# Patient Record
Sex: MALE | Race: ASIAN | NOT HISPANIC OR LATINO
[De-identification: names, ages, dates, MRNs, and addresses within clinical notes are randomized per-mention and may not be internally consistent; named-entity substitution may affect disease eponyms.]

---

## 2021-03-17 ENCOUNTER — APPOINTMENT (OUTPATIENT)
Dept: OTHER | Facility: CLINIC | Age: 46
End: 2021-03-17
Payer: COMMERCIAL

## 2021-03-17 VITALS
HEIGHT: 67 IN | OXYGEN SATURATION: 98 % | BODY MASS INDEX: 27.62 KG/M2 | RESPIRATION RATE: 18 BRPM | WEIGHT: 176 LBS | SYSTOLIC BLOOD PRESSURE: 144 MMHG | HEART RATE: 52 BPM | DIASTOLIC BLOOD PRESSURE: 95 MMHG | TEMPERATURE: 97.5 F

## 2021-03-17 DIAGNOSIS — Z04.9 ENCOUNTER FOR EXAMINATION AND OBSERVATION FOR UNSPECIFIED REASON: ICD-10-CM

## 2021-03-17 DIAGNOSIS — Z23 ENCOUNTER FOR IMMUNIZATION: ICD-10-CM

## 2021-03-17 PROCEDURE — 99396 PREV VISIT EST AGE 40-64: CPT | Mod: 25

## 2021-04-01 LAB
ALBUMIN SERPL ELPH-MCNC: 4.7 G/DL
ALP BLD-CCNC: 76 U/L
ALT SERPL-CCNC: 34 U/L
ANION GAP SERPL CALC-SCNC: 12 MMOL/L
APPEARANCE: CLEAR
AST SERPL-CCNC: 29 U/L
BACTERIA: NEGATIVE
BASOPHILS # BLD AUTO: 0.04 K/UL
BASOPHILS NFR BLD AUTO: 0.7 %
BILIRUB SERPL-MCNC: 0.5 MG/DL
BILIRUBIN URINE: NEGATIVE
BLOOD URINE: NEGATIVE
BUN SERPL-MCNC: 24 MG/DL
CALCIUM SERPL-MCNC: 9.9 MG/DL
CHLORIDE SERPL-SCNC: 99 MMOL/L
CHOLEST SERPL-MCNC: 222 MG/DL
CO2 SERPL-SCNC: 28 MMOL/L
COLOR: NORMAL
CREAT SERPL-MCNC: 1.35 MG/DL
EOSINOPHIL # BLD AUTO: 0.1 K/UL
EOSINOPHIL NFR BLD AUTO: 1.7 %
GLUCOSE QUALITATIVE U: NEGATIVE
GLUCOSE SERPL-MCNC: 95 MG/DL
HCT VFR BLD CALC: 49.6 %
HDLC SERPL-MCNC: 66 MG/DL
HGB BLD-MCNC: 15.7 G/DL
HYALINE CASTS: 0 /LPF
IMM GRANULOCYTES NFR BLD AUTO: 0.3 %
KETONES URINE: NEGATIVE
LDLC SERPL CALC-MCNC: 133 MG/DL
LEUKOCYTE ESTERASE URINE: NEGATIVE
LYMPHOCYTES # BLD AUTO: 1.78 K/UL
LYMPHOCYTES NFR BLD AUTO: 30.5 %
MAN DIFF?: NORMAL
MCHC RBC-ENTMCNC: 31.7 GM/DL
MCHC RBC-ENTMCNC: 31.9 PG
MCV RBC AUTO: 100.8 FL
MICROSCOPIC-UA: NORMAL
MONOCYTES # BLD AUTO: 0.44 K/UL
MONOCYTES NFR BLD AUTO: 7.5 %
NEUTROPHILS # BLD AUTO: 3.45 K/UL
NEUTROPHILS NFR BLD AUTO: 59.3 %
NITRITE URINE: NEGATIVE
NONHDLC SERPL-MCNC: 155 MG/DL
PH URINE: 6.5
PLATELET # BLD AUTO: 302 K/UL
POTASSIUM SERPL-SCNC: 4.4 MMOL/L
PROT SERPL-MCNC: 7.7 G/DL
PROTEIN URINE: NEGATIVE
RBC # BLD: 4.92 M/UL
RBC # FLD: 13.4 %
RED BLOOD CELLS URINE: 0 /HPF
SODIUM SERPL-SCNC: 139 MMOL/L
SPECIFIC GRAVITY URINE: 1.01
SQUAMOUS EPITHELIAL CELLS: 0 /HPF
TRIGL SERPL-MCNC: 112 MG/DL
UROBILINOGEN URINE: NORMAL
WBC # FLD AUTO: 5.83 K/UL
WHITE BLOOD CELLS URINE: 0 /HPF

## 2021-04-11 DIAGNOSIS — Z01.818 ENCOUNTER FOR OTHER PREPROCEDURAL EXAMINATION: ICD-10-CM

## 2021-04-12 ENCOUNTER — APPOINTMENT (OUTPATIENT)
Dept: RADIOLOGY | Facility: IMAGING CENTER | Age: 46
End: 2021-04-12

## 2021-04-12 ENCOUNTER — APPOINTMENT (OUTPATIENT)
Dept: DISASTER EMERGENCY | Facility: CLINIC | Age: 46
End: 2021-04-12

## 2021-04-13 LAB — SARS-COV-2 N GENE NPH QL NAA+PROBE: NOT DETECTED

## 2021-04-15 ENCOUNTER — OUTPATIENT (OUTPATIENT)
Dept: OUTPATIENT SERVICES | Facility: HOSPITAL | Age: 46
LOS: 1 days | End: 2021-04-15
Payer: COMMERCIAL

## 2021-04-15 ENCOUNTER — APPOINTMENT (OUTPATIENT)
Dept: RADIOLOGY | Facility: IMAGING CENTER | Age: 46
End: 2021-04-15

## 2021-04-15 ENCOUNTER — APPOINTMENT (OUTPATIENT)
Dept: PULMONOLOGY | Facility: CLINIC | Age: 46
End: 2021-04-15
Payer: COMMERCIAL

## 2021-04-15 VITALS
BODY MASS INDEX: 27.95 KG/M2 | OXYGEN SATURATION: 97 % | WEIGHT: 176 LBS | HEIGHT: 66.5 IN | HEART RATE: 55 BPM | TEMPERATURE: 98 F

## 2021-04-15 VITALS
HEIGHT: 66.5 IN | SYSTOLIC BLOOD PRESSURE: 136 MMHG | TEMPERATURE: 98.2 F | BODY MASS INDEX: 27.95 KG/M2 | OXYGEN SATURATION: 97 % | RESPIRATION RATE: 15 BRPM | HEART RATE: 62 BPM | WEIGHT: 176 LBS | DIASTOLIC BLOOD PRESSURE: 90 MMHG

## 2021-04-15 DIAGNOSIS — J45.990 EXERCISE INDUCED BRONCHOSPASM: ICD-10-CM

## 2021-04-15 DIAGNOSIS — Z04.9 ENCOUNTER FOR EXAMINATION AND OBSERVATION FOR UNSPECIFIED REASON: ICD-10-CM

## 2021-04-15 DIAGNOSIS — R06.00 DYSPNEA, UNSPECIFIED: ICD-10-CM

## 2021-04-15 PROCEDURE — 94010 BREATHING CAPACITY TEST: CPT

## 2021-04-15 PROCEDURE — 94726 PLETHYSMOGRAPHY LUNG VOLUMES: CPT

## 2021-04-15 PROCEDURE — 71046 X-RAY EXAM CHEST 2 VIEWS: CPT

## 2021-04-15 PROCEDURE — ZZZZZ: CPT

## 2021-04-15 PROCEDURE — 71046 X-RAY EXAM CHEST 2 VIEWS: CPT | Mod: 26

## 2021-04-15 PROCEDURE — 99204 OFFICE O/P NEW MOD 45 MIN: CPT | Mod: 25

## 2021-04-15 PROCEDURE — 94729 DIFFUSING CAPACITY: CPT

## 2021-04-15 RX ORDER — ALBUTEROL SULFATE 90 UG/1
108 (90 BASE) AEROSOL, METERED RESPIRATORY (INHALATION) EVERY 4 HOURS
Qty: 1 | Refills: 2 | Status: ACTIVE | COMMUNITY
Start: 2021-04-15 | End: 1900-01-01

## 2021-04-16 PROBLEM — R06.00 DYSPNEA: Status: ACTIVE | Noted: 2021-04-16

## 2021-04-16 NOTE — PHYSICAL EXAM
[No Acute Distress] : no acute distress [II] : Mallampati Class: II [Normal Appearance] : normal appearance [No Neck Mass] : no neck mass [Normal Rate/Rhythm] : normal rate/rhythm [Normal S1, S2] : normal s1, s2 [No Murmurs] : no murmurs [No Resp Distress] : no resp distress [Clear to Auscultation Bilaterally] : clear to auscultation bilaterally [No Abnormalities] : no abnormalities [No Clubbing] : no clubbing [No Cyanosis] : no cyanosis [No Edema] : no edema [No Focal Deficits] : no focal deficits [Oriented x3] : oriented x3 [Normal Affect] : normal affect

## 2021-04-16 NOTE — HISTORY OF PRESENT ILLNESS
[Former] : former [< 30 pack-years] : < 30 pack-years [Never] : never [TextBox_4] : Mr. Chavez is a 45 year old male with no significant pmhx who comes in for an evaluation of dyspnea. The patient states he's been experiencing shortness of breath for several years now. States that he used to run 5-10 miles several years ago and has been declining ever since. Indicates that at times he has difficulty running 1 mile due to shortness of breath. He may have an associated cough when he runs. This does not occur with his weight training. No shortness of breath with simple ambulation nor going upstairs. He was exposed to 9-11 but has otherwise appeared to be normal since his exposure. He is interested to see what he can do to increase his "lung capacity ".\par \par Occupation:  [TextBox_11] : 0.5 [YearQuit] : 2009

## 2021-04-16 NOTE — ASSESSMENT
[FreeTextEntry1] : Mr. Chavez is a 45-year-old male with a significant past medical history of 9/11 exposure who comes in for evaluation of dyspnea on exertion. His pulmonary function tests are normal and a recent chest x-ray done prior to coming into the office is also normal. He underwent pulmonary function tests back in 2015 that showed some bronchodilation response. His story is consistent with exercise-induced asthma symptoms only occur during extreme cardiovascular exercise. We discussed that he should attempt to use albuterol either prior to his exercise routine or bring his inhaler when he is running and use it if he gets short of breath. If he has a good response to this treatment, and the likelihood of him having exercise-induced asthma as high. If he feels there is no improvement, then he may benefit from undergoing a cardiopulmonary stress test. We discussed how to use an albuterol inhaler with proper technique and I gave him a spacer.\par \par He will followup with me in 3 months.

## 2024-01-09 ENCOUNTER — APPOINTMENT (OUTPATIENT)
Dept: OTHER | Facility: CLINIC | Age: 49
End: 2024-01-09

## 2024-03-11 ENCOUNTER — RX ONLY (RX ONLY)
Age: 49
End: 2024-03-11

## 2024-03-11 ENCOUNTER — OFFICE (OUTPATIENT)
Dept: URBAN - METROPOLITAN AREA CLINIC 90 | Facility: CLINIC | Age: 49
Setting detail: OPHTHALMOLOGY
End: 2024-03-11
Payer: COMMERCIAL

## 2024-03-11 DIAGNOSIS — H52.4: ICD-10-CM

## 2024-03-11 DIAGNOSIS — H40.013: ICD-10-CM

## 2024-03-11 PROBLEM — H11.133 CONJUNCTIVAL PIGMENTATIONS; BOTH EYES: Status: ACTIVE | Noted: 2024-03-11

## 2024-03-11 PROBLEM — H53.001 AMBLYOPIA; RIGHT EYE: Status: ACTIVE | Noted: 2024-03-11

## 2024-03-11 PROBLEM — H16.223 DRY EYE SYNDROME K SICCA; BOTH EYES: Status: ACTIVE | Noted: 2024-03-11

## 2024-03-11 PROBLEM — H52.7 REFRACTIVE ERROR: Status: ACTIVE | Noted: 2024-03-11

## 2024-03-11 PROCEDURE — 92015 DETERMINE REFRACTIVE STATE: CPT | Performed by: OPHTHALMOLOGY

## 2024-03-11 PROCEDURE — 92250 FUNDUS PHOTOGRAPHY W/I&R: CPT | Performed by: OPHTHALMOLOGY

## 2024-03-11 PROCEDURE — 92004 COMPRE OPH EXAM NEW PT 1/>: CPT | Performed by: OPHTHALMOLOGY

## 2024-03-11 ASSESSMENT — REFRACTION_MANIFEST
OD_ADD: +1.75
OS_VA2: 20/25(J1)
OD_VA2: 20/25(J1)
OS_SPHERE: +1.25
OD_VA1: 20/40+ SLOW
OS_ADD: +1.75
OS_AXIS: 85
OD_AXIS: 105
OD_CYLINDER: -1.00
OD_CYLINDER: -1.00
OD_SPHERE: +2.75
OD_ADD: +1.75
OD_VA2: 20/25(J1)
OS_VA1: 20/20
OS_VA1: 20/20
OD_SPHERE: +2.25
OD_AXIS: 105
OS_SPHERE: +1.25
OD_VA1: 20/40+ SLOW
OS_CYLINDER: -1.00
OS_VA2: 20/25(J1)
OS_ADD: +1.75
OS_AXIS: 85
OS_CYLINDER: -1.00

## 2024-03-11 ASSESSMENT — REFRACTION_CURRENTRX
OD_ADD: +1.25
OS_SPHERE: +0.50
OS_ADD: +1.25
OS_OVR_VA: 20/
OS_VPRISM_DIRECTION: PROGS
OD_SPHERE: +1.75
OD_AXIS: 117
OD_CYLINDER: -0.50
OS_CYLINDER: -0.50
OD_VPRISM_DIRECTION: PROGS
OS_AXIS: 074
OD_OVR_VA: 20/

## 2024-03-11 ASSESSMENT — SPHEQUIV_DERIVED
OS_SPHEQUIV: 0.75
OD_SPHEQUIV: 1.75
OD_SPHEQUIV: 2.25
OS_SPHEQUIV: 0.75

## 2024-09-14 ENCOUNTER — NON-APPOINTMENT (OUTPATIENT)
Age: 49
End: 2024-09-14

## 2024-09-16 ENCOUNTER — OFFICE (OUTPATIENT)
Dept: URBAN - METROPOLITAN AREA CLINIC 90 | Facility: CLINIC | Age: 49
Setting detail: OPHTHALMOLOGY
End: 2024-09-16
Payer: COMMERCIAL

## 2024-09-16 DIAGNOSIS — H40.013: ICD-10-CM

## 2024-09-16 DIAGNOSIS — H11.133: ICD-10-CM

## 2024-09-16 PROBLEM — Z83.511 FAMILY HISTORY OF GLAUCOMA: Status: ACTIVE | Noted: 2024-09-16

## 2024-09-16 PROCEDURE — 92083 EXTENDED VISUAL FIELD XM: CPT | Performed by: OPHTHALMOLOGY

## 2024-09-16 PROCEDURE — 92133 CPTRZD OPH DX IMG PST SGM ON: CPT | Performed by: OPHTHALMOLOGY

## 2024-09-16 PROCEDURE — 92012 INTRM OPH EXAM EST PATIENT: CPT | Performed by: OPHTHALMOLOGY

## 2024-09-16 PROCEDURE — 76514 ECHO EXAM OF EYE THICKNESS: CPT | Performed by: OPHTHALMOLOGY

## 2024-09-16 ASSESSMENT — CONFRONTATIONAL VISUAL FIELD TEST (CVF)
OD_FINDINGS: FULL
OS_FINDINGS: FULL

## 2025-03-21 ENCOUNTER — OFFICE (OUTPATIENT)
Facility: LOCATION | Age: 50
Setting detail: OPHTHALMOLOGY
End: 2025-03-21
Payer: COMMERCIAL

## 2025-03-21 DIAGNOSIS — H53.001: ICD-10-CM

## 2025-03-21 DIAGNOSIS — H16.223: ICD-10-CM

## 2025-03-21 DIAGNOSIS — H25.13: ICD-10-CM

## 2025-03-21 DIAGNOSIS — H40.013: ICD-10-CM

## 2025-03-21 DIAGNOSIS — H11.133: ICD-10-CM

## 2025-03-21 DIAGNOSIS — Z83.511: ICD-10-CM

## 2025-03-21 PROCEDURE — 92012 INTRM OPH EXAM EST PATIENT: CPT | Performed by: OPHTHALMOLOGY

## 2025-03-21 PROCEDURE — 92083 EXTENDED VISUAL FIELD XM: CPT | Performed by: OPHTHALMOLOGY

## 2025-03-21 PROCEDURE — 76514 ECHO EXAM OF EYE THICKNESS: CPT | Performed by: OPHTHALMOLOGY

## 2025-03-21 ASSESSMENT — REFRACTION_AUTOREFRACTION
OS_SPHERE: +1.75
OD_AXIS: 106
OS_CYLINDER: -1.25
OD_SPHERE: +3.75
OS_AXIS: 089
OD_CYLINDER: -1.25

## 2025-03-21 ASSESSMENT — REFRACTION_MANIFEST
OD_AXIS: 105
OS_SPHERE: +1.25
OD_ADD: +1.75
OS_AXIS: 85
OD_AXIS: 105
OS_CYLINDER: -1.00
OD_VA1: 20/40+ SLOW
OS_VA2: 20/25(J1)
OD_SPHERE: +2.75
OD_CYLINDER: -1.00
OS_SPHERE: +1.25
OS_ADD: +1.75
OD_VA2: 20/25(J1)
OS_VA1: 20/20
OS_VA2: 20/25(J1)
OD_SPHERE: +2.25
OS_AXIS: 85
OS_VA1: 20/20
OS_ADD: +1.75
OD_ADD: +1.75
OD_VA2: 20/25(J1)
OD_CYLINDER: -1.00
OS_CYLINDER: -1.00
OD_VA1: 20/40+ SLOW

## 2025-03-21 ASSESSMENT — KERATOMETRY
OD_K2POWER_DIOPTERS: 1.75
OD_K1POWER_DIOPTERS: 40.75
OS_K2POWER_DIOPTERS: 41.50
OD_AXISANGLE_DEGREES: 014
OS_AXISANGLE_DEGREES: 149
OS_K1POWER_DIOPTERS: 41.00

## 2025-03-21 ASSESSMENT — VISUAL ACUITY
OS_BCVA: 20/40-
OD_BCVA: 20/20

## 2025-03-21 ASSESSMENT — REFRACTION_CURRENTRX
OD_CYLINDER: -1.00
OS_CYLINDER: -1.00
OS_SPHERE: +0.50
OS_ADD: +2.25
OD_ADD: +1.25
OD_SPHERE: +2.25
OS_VPRISM_DIRECTION: PROGS
OS_AXIS: 074
OD_VPRISM_DIRECTION: PROGS
OD_ADD: +2.25
OD_CYLINDER: -0.50
OS_VPRISM_DIRECTION: PROGS
OS_CYLINDER: -0.50
OD_AXIS: 117
OD_CYLINDER: -1.00
OS_AXIS: 084
OD_SPHERE: +2.25
OS_ADD: +1.25
OS_SPHERE: +0.25
OD_OVR_VA: 20/
OD_ADD: +2.25
OS_VPRISM_DIRECTION: PROGS
OD_VPRISM_DIRECTION: PROGS
OD_AXIS: 113
OS_ADD: +2.25
OS_CYLINDER: -1.00
OS_SPHERE: +1.25
OD_SPHERE: +1.75
OS_OVR_VA: 20/
OD_AXIS: 119
OD_OVR_VA: 20/
OS_OVR_VA: 20/
OS_AXIS: 086
OD_VPRISM_DIRECTION: PROGS

## 2025-03-21 ASSESSMENT — TONOMETRY
OS_IOP_MMHG: 19
OD_IOP_MMHG: 19
OS_IOP_MMHG: 19

## 2025-03-21 ASSESSMENT — PACHYMETRY
OD_CT_CORRECTION: -1
OS_CT_UM: 555
OS_CT_CORRECTION: -1
OD_CT_UM: 567

## 2025-03-21 ASSESSMENT — CONFRONTATIONAL VISUAL FIELD TEST (CVF)
OS_FINDINGS: FULL
OD_FINDINGS: FULL

## 2025-03-21 ASSESSMENT — SUPERFICIAL PUNCTATE KERATITIS (SPK)
OS_SPK: T
OD_SPK: T